# Patient Record
Sex: FEMALE | Race: WHITE | NOT HISPANIC OR LATINO | Employment: FULL TIME | URBAN - NONMETROPOLITAN AREA
[De-identification: names, ages, dates, MRNs, and addresses within clinical notes are randomized per-mention and may not be internally consistent; named-entity substitution may affect disease eponyms.]

---

## 2021-02-18 ENCOUNTER — HOSPITAL ENCOUNTER (EMERGENCY)
Facility: HOSPITAL | Age: 36
Discharge: HOME | End: 2021-02-18
Attending: EMERGENCY MEDICINE

## 2021-02-18 VITALS
OXYGEN SATURATION: 98 % | HEART RATE: 94 BPM | RESPIRATION RATE: 18 BRPM | TEMPERATURE: 98 F | SYSTOLIC BLOOD PRESSURE: 110 MMHG | DIASTOLIC BLOOD PRESSURE: 81 MMHG | BODY MASS INDEX: 25.61 KG/M2 | HEIGHT: 64 IN | WEIGHT: 150 LBS

## 2021-02-18 DIAGNOSIS — U07.1 COVID-19 VIRUS DETECTED: ICD-10-CM

## 2021-02-18 DIAGNOSIS — U07.1 COVID-19 VIRUS INFECTION: Primary | ICD-10-CM

## 2021-02-18 LAB
CTP QC/QA: YES
SARS-COV-2 RDRP RESP QL NAA+PROBE: POSITIVE

## 2021-02-18 PROCEDURE — 99283 EMERGENCY DEPT VISIT LOW MDM: CPT

## 2021-02-18 PROCEDURE — U0002 COVID-19 LAB TEST NON-CDC: HCPCS | Performed by: EMERGENCY MEDICINE

## 2021-02-18 RX ORDER — AZITHROMYCIN 250 MG/1
250 TABLET, FILM COATED ORAL DAILY
Qty: 6 TABLET | Refills: 0 | Status: SHIPPED | OUTPATIENT
Start: 2021-02-18

## 2021-11-30 ENCOUNTER — HOSPITAL ENCOUNTER (EMERGENCY)
Facility: HOSPITAL | Age: 36
Discharge: HOME | End: 2021-11-30
Attending: EMERGENCY MEDICINE

## 2021-11-30 VITALS
DIASTOLIC BLOOD PRESSURE: 72 MMHG | SYSTOLIC BLOOD PRESSURE: 128 MMHG | RESPIRATION RATE: 16 BRPM | HEART RATE: 65 BPM | OXYGEN SATURATION: 100 % | WEIGHT: 142 LBS | BODY MASS INDEX: 24.24 KG/M2 | HEIGHT: 64 IN | TEMPERATURE: 98 F

## 2021-11-30 DIAGNOSIS — R53.1 WEAKNESS: ICD-10-CM

## 2021-11-30 DIAGNOSIS — B34.9 ACUTE VIRAL SYNDROME: Primary | ICD-10-CM

## 2021-11-30 LAB
ALBUMIN SERPL BCP-MCNC: 3.9 G/DL (ref 3.5–5.2)
ALP SERPL-CCNC: 101 U/L (ref 55–135)
ALT SERPL W/O P-5'-P-CCNC: 17 U/L (ref 10–44)
ANION GAP SERPL CALC-SCNC: 7 MMOL/L (ref 8–16)
AST SERPL-CCNC: 17 U/L (ref 10–40)
BASOPHILS # BLD AUTO: 0.04 K/UL (ref 0–0.2)
BASOPHILS NFR BLD: 0.2 % (ref 0–1.9)
BILIRUB SERPL-MCNC: 0.3 MG/DL (ref 0.1–1)
BUN SERPL-MCNC: 15 MG/DL (ref 6–20)
CALCIUM SERPL-MCNC: 9.4 MG/DL (ref 8.7–10.5)
CHLORIDE SERPL-SCNC: 105 MMOL/L (ref 95–110)
CO2 SERPL-SCNC: 26 MMOL/L (ref 23–29)
CREAT SERPL-MCNC: 0.8 MG/DL (ref 0.5–1.4)
CTP QC/QA: YES
DIFFERENTIAL METHOD: ABNORMAL
EOSINOPHIL # BLD AUTO: 0 K/UL (ref 0–0.5)
EOSINOPHIL NFR BLD: 0 % (ref 0–8)
ERYTHROCYTE [DISTWIDTH] IN BLOOD BY AUTOMATED COUNT: 22.1 % (ref 11.5–14.5)
EST. GFR  (AFRICAN AMERICAN): >60 ML/MIN/1.73 M^2
EST. GFR  (NON AFRICAN AMERICAN): >60 ML/MIN/1.73 M^2
GLUCOSE SERPL-MCNC: 127 MG/DL (ref 70–110)
HCT VFR BLD AUTO: 33 % (ref 37–48.5)
HGB BLD-MCNC: 10 G/DL (ref 12–16)
IMM GRANULOCYTES # BLD AUTO: 0.07 K/UL (ref 0–0.04)
IMM GRANULOCYTES NFR BLD AUTO: 0.3 % (ref 0–0.5)
INFLUENZA A, MOLECULAR: NEGATIVE
INFLUENZA B, MOLECULAR: NEGATIVE
LACTATE SERPL-SCNC: 1.7 MMOL/L (ref 0.5–2.2)
LIPASE SERPL-CCNC: 56 U/L (ref 23–300)
LYMPHOCYTES # BLD AUTO: 0.6 K/UL (ref 1–4.8)
LYMPHOCYTES NFR BLD: 3 % (ref 18–48)
MAGNESIUM SERPL-MCNC: 2.1 MG/DL (ref 1.6–2.6)
MCH RBC QN AUTO: 21.8 PG (ref 27–31)
MCHC RBC AUTO-ENTMCNC: 30.3 G/DL (ref 32–36)
MCV RBC AUTO: 72 FL (ref 82–98)
MONOCYTES # BLD AUTO: 0.5 K/UL (ref 0.3–1)
MONOCYTES NFR BLD: 2.6 % (ref 4–15)
NEUTROPHILS # BLD AUTO: 19.5 K/UL (ref 1.8–7.7)
NEUTROPHILS NFR BLD: 93.9 % (ref 38–73)
NRBC BLD-RTO: 0 /100 WBC
PLATELET # BLD AUTO: 456 K/UL (ref 150–450)
PMV BLD AUTO: 10.7 FL (ref 9.2–12.9)
POTASSIUM SERPL-SCNC: 4.6 MMOL/L (ref 3.5–5.1)
PROT SERPL-MCNC: 7.9 G/DL (ref 6–8.4)
RBC # BLD AUTO: 4.59 M/UL (ref 4–5.4)
SARS-COV-2 RDRP RESP QL NAA+PROBE: NEGATIVE
SODIUM SERPL-SCNC: 138 MMOL/L (ref 136–145)
SPECIMEN SOURCE: NORMAL
TROPONIN I SERPL DL<=0.01 NG/ML-MCNC: <0.02 NG/ML (ref 0–0.03)
WBC # BLD AUTO: 20.81 K/UL (ref 3.9–12.7)

## 2021-11-30 PROCEDURE — 63600175 PHARM REV CODE 636 W HCPCS: Performed by: EMERGENCY MEDICINE

## 2021-11-30 PROCEDURE — 25000003 PHARM REV CODE 250: Performed by: EMERGENCY MEDICINE

## 2021-11-30 PROCEDURE — 36415 COLL VENOUS BLD VENIPUNCTURE: CPT | Performed by: EMERGENCY MEDICINE

## 2021-11-30 PROCEDURE — 87502 INFLUENZA DNA AMP PROBE: CPT | Performed by: EMERGENCY MEDICINE

## 2021-11-30 PROCEDURE — 93005 ELECTROCARDIOGRAM TRACING: CPT

## 2021-11-30 PROCEDURE — 93010 ELECTROCARDIOGRAM REPORT: CPT | Mod: ,,, | Performed by: INTERNAL MEDICINE

## 2021-11-30 PROCEDURE — 83735 ASSAY OF MAGNESIUM: CPT | Performed by: EMERGENCY MEDICINE

## 2021-11-30 PROCEDURE — 96361 HYDRATE IV INFUSION ADD-ON: CPT

## 2021-11-30 PROCEDURE — 83605 ASSAY OF LACTIC ACID: CPT | Performed by: EMERGENCY MEDICINE

## 2021-11-30 PROCEDURE — 96375 TX/PRO/DX INJ NEW DRUG ADDON: CPT

## 2021-11-30 PROCEDURE — 83690 ASSAY OF LIPASE: CPT | Performed by: EMERGENCY MEDICINE

## 2021-11-30 PROCEDURE — 96374 THER/PROPH/DIAG INJ IV PUSH: CPT

## 2021-11-30 PROCEDURE — 80053 COMPREHEN METABOLIC PANEL: CPT | Performed by: EMERGENCY MEDICINE

## 2021-11-30 PROCEDURE — U0002 COVID-19 LAB TEST NON-CDC: HCPCS | Performed by: EMERGENCY MEDICINE

## 2021-11-30 PROCEDURE — 93010 EKG 12-LEAD: ICD-10-PCS | Mod: ,,, | Performed by: INTERNAL MEDICINE

## 2021-11-30 PROCEDURE — 85025 COMPLETE CBC W/AUTO DIFF WBC: CPT | Performed by: EMERGENCY MEDICINE

## 2021-11-30 PROCEDURE — 84484 ASSAY OF TROPONIN QUANT: CPT | Performed by: EMERGENCY MEDICINE

## 2021-11-30 PROCEDURE — 99285 EMERGENCY DEPT VISIT HI MDM: CPT | Mod: 25

## 2021-11-30 RX ORDER — PREDNISONE 20 MG/1
20 TABLET ORAL DAILY
Qty: 5 TABLET | Refills: 0 | Status: SHIPPED | OUTPATIENT
Start: 2021-11-30 | End: 2021-12-05

## 2021-11-30 RX ORDER — DEXAMETHASONE SODIUM PHOSPHATE 4 MG/ML
4 INJECTION, SOLUTION INTRA-ARTICULAR; INTRALESIONAL; INTRAMUSCULAR; INTRAVENOUS; SOFT TISSUE
Status: COMPLETED | OUTPATIENT
Start: 2021-11-30 | End: 2021-11-30

## 2021-11-30 RX ORDER — ONDANSETRON 4 MG/1
4 TABLET, ORALLY DISINTEGRATING ORAL EVERY 6 HOURS PRN
Qty: 20 TABLET | Refills: 0 | Status: SHIPPED | OUTPATIENT
Start: 2021-11-30

## 2021-11-30 RX ORDER — DEXAMETHASONE SODIUM PHOSPHATE 4 MG/ML
INJECTION, SOLUTION INTRA-ARTICULAR; INTRALESIONAL; INTRAMUSCULAR; INTRAVENOUS; SOFT TISSUE
Status: DISPENSED
Start: 2021-11-30 | End: 2021-12-01

## 2021-11-30 RX ORDER — KETOROLAC TROMETHAMINE 30 MG/ML
INJECTION, SOLUTION INTRAMUSCULAR; INTRAVENOUS
Status: DISPENSED
Start: 2021-11-30 | End: 2021-12-01

## 2021-11-30 RX ORDER — ONDANSETRON 2 MG/ML
4 INJECTION INTRAMUSCULAR; INTRAVENOUS
Status: COMPLETED | OUTPATIENT
Start: 2021-11-30 | End: 2021-11-30

## 2021-11-30 RX ORDER — ONDANSETRON 2 MG/ML
INJECTION INTRAMUSCULAR; INTRAVENOUS
Status: DISPENSED
Start: 2021-11-30 | End: 2021-12-01

## 2021-11-30 RX ORDER — KETOROLAC TROMETHAMINE 30 MG/ML
15 INJECTION, SOLUTION INTRAMUSCULAR; INTRAVENOUS
Status: COMPLETED | OUTPATIENT
Start: 2021-11-30 | End: 2021-11-30

## 2021-11-30 RX ADMIN — KETOROLAC TROMETHAMINE 15 MG: 30 INJECTION, SOLUTION INTRAMUSCULAR; INTRAVENOUS at 08:11

## 2021-11-30 RX ADMIN — SODIUM CHLORIDE 1000 ML: 0.9 INJECTION, SOLUTION INTRAVENOUS at 07:11

## 2021-11-30 RX ADMIN — DEXAMETHASONE SODIUM PHOSPHATE 4 MG: 4 INJECTION, SOLUTION INTRA-ARTICULAR; INTRALESIONAL; INTRAMUSCULAR; INTRAVENOUS; SOFT TISSUE at 08:11

## 2021-11-30 RX ADMIN — ONDANSETRON HYDROCHLORIDE 4 MG: 2 SOLUTION INTRAMUSCULAR; INTRAVENOUS at 07:11

## 2022-11-15 ENCOUNTER — HOSPITAL ENCOUNTER (EMERGENCY)
Facility: HOSPITAL | Age: 37
Discharge: HOME | End: 2022-11-15
Attending: EMERGENCY MEDICINE

## 2022-11-15 VITALS
BODY MASS INDEX: 24.37 KG/M2 | WEIGHT: 142 LBS | RESPIRATION RATE: 18 BRPM | SYSTOLIC BLOOD PRESSURE: 126 MMHG | DIASTOLIC BLOOD PRESSURE: 75 MMHG | OXYGEN SATURATION: 100 % | TEMPERATURE: 99 F | HEART RATE: 91 BPM

## 2022-11-15 DIAGNOSIS — S40.021A CONTUSION OF RIGHT UPPER ARM, INITIAL ENCOUNTER: ICD-10-CM

## 2022-11-15 DIAGNOSIS — S09.90XA MINOR HEAD INJURY, INITIAL ENCOUNTER: Primary | ICD-10-CM
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
## 2022-11-15 PROCEDURE — 99284 EMERGENCY DEPT VISIT MOD MDM: CPT

## 2022-11-15 RX ORDER — DICLOFENAC SODIUM 75 MG/1
75 TABLET, DELAYED RELEASE ORAL 2 TIMES DAILY
Qty: 10 TABLET | Refills: 0 | Status: SHIPPED | OUTPATIENT
Start: 2022-11-15 | End: 2022-11-20

## 2022-11-15 RX ORDER — METHOCARBAMOL 500 MG/1
1000 TABLET, FILM COATED ORAL 2 TIMES DAILY PRN
Qty: 20 TABLET | Refills: 0 | Status: SHIPPED | OUTPATIENT
Start: 2022-11-15 | End: 2022-11-20

## 2022-11-15 NOTE — Clinical Note
"Ryan Liveleonides Rand was seen and treated in our emergency department on 11/15/2022.  She may return to work on 11/16/2022.       If you have any questions or concerns, please don't hesitate to call.      Ashia Duron NP"

## 2022-11-15 NOTE — DISCHARGE INSTRUCTIONS
Take medications as directed.  It is important that you see your primary doctor in 1-2 days for further evaluation of your symptoms.  Return to the emergency room for worsening condition.

## 2022-11-15 NOTE — Clinical Note
"Ryan Liveleonides Rand was seen and treated in our emergency department on 11/15/2022.  She may return to work on 11/17/2022.       If you have any questions or concerns, please don't hesitate to call.      Ashia Duron NP"
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
## 2023-09-11 ENCOUNTER — HOSPITAL ENCOUNTER (EMERGENCY)
Facility: HOSPITAL | Age: 38
Discharge: HOME | End: 2023-09-11
Attending: EMERGENCY MEDICINE

## 2023-09-11 VITALS
OXYGEN SATURATION: 99 % | BODY MASS INDEX: 25.03 KG/M2 | TEMPERATURE: 98 F | HEART RATE: 81 BPM | DIASTOLIC BLOOD PRESSURE: 74 MMHG | WEIGHT: 146.63 LBS | HEIGHT: 64 IN | RESPIRATION RATE: 16 BRPM | SYSTOLIC BLOOD PRESSURE: 125 MMHG

## 2023-09-11 DIAGNOSIS — R09.89 RUNNY NOSE: ICD-10-CM

## 2023-09-11 DIAGNOSIS — R51.9 NONINTRACTABLE HEADACHE, UNSPECIFIED CHRONICITY PATTERN, UNSPECIFIED HEADACHE TYPE: Primary | ICD-10-CM

## 2023-09-11 LAB
CTP QC/QA: YES
SARS-COV-2 RDRP RESP QL NAA+PROBE: NEGATIVE

## 2023-09-11 PROCEDURE — 87635 SARS-COV-2 COVID-19 AMP PRB: CPT | Performed by: NURSE PRACTITIONER

## 2023-09-11 PROCEDURE — 99283 EMERGENCY DEPT VISIT LOW MDM: CPT

## 2023-09-11 RX ORDER — IBUPROFEN 600 MG/1
600 TABLET ORAL EVERY 8 HOURS PRN
Qty: 9 TABLET | Refills: 0 | Status: SHIPPED | OUTPATIENT
Start: 2023-09-11 | End: 2023-09-14

## 2023-09-11 RX ORDER — CETIRIZINE HYDROCHLORIDE 10 MG/1
10 TABLET ORAL DAILY
Qty: 10 TABLET | Refills: 0 | Status: SHIPPED | OUTPATIENT
Start: 2023-09-11 | End: 2023-09-21

## 2023-09-11 NOTE — ED PROVIDER NOTES
"Encounter Date: 9/11/2023       History     Chief Complaint   Patient presents with    Headache     Patient stated "I was feeling bad yesterday and when I went to work some people had the Covid". Patient complaining of a headache today.      This is a 37-year-old black female with noncontributory past medical history who presents to the emergency department with concerns regarding COVID-19 after known exposure at work.  Patient reports generalized headache and runny nose.  She denies known fever, cough, vomiting, or diarrhea.      Review of patient's allergies indicates:  No Known Allergies  No past medical history on file.  No past surgical history on file.  No family history on file.  Social History     Tobacco Use    Smoking status: Never     Review of Systems   Constitutional:  Negative for fever.   HENT:  Positive for rhinorrhea. Negative for congestion and sore throat.    Gastrointestinal:  Negative for diarrhea and vomiting.   Neurological:  Positive for headaches.       Physical Exam     Initial Vitals [09/11/23 1730]   BP Pulse Resp Temp SpO2   125/74 81 16 98 °F (36.7 °C) 99 %      MAP       --         Physical Exam    Nursing note and vitals reviewed.  Constitutional: She appears well-developed and well-nourished. She is active. No distress.   HENT:   Head: Normocephalic and atraumatic.   Mouth/Throat: No oropharyngeal exudate.   Eyes: EOM are normal. Pupils are equal, round, and reactive to light.   Neck: Neck supple.   Normal range of motion.  Cardiovascular:  Normal rate, regular rhythm and normal heart sounds.           Pulmonary/Chest: Breath sounds normal. No respiratory distress.   Musculoskeletal:         General: Normal range of motion.      Cervical back: Normal range of motion and neck supple.     Neurological: She is alert and oriented to person, place, and time. GCS score is 15. GCS eye subscore is 4. GCS verbal subscore is 5. GCS motor subscore is 6.   Skin: Skin is warm and dry. Capillary " refill takes less than 2 seconds.   Psychiatric: She has a normal mood and affect. Her behavior is normal. Thought content normal.         ED Course   Procedures  Labs Reviewed   SARS-COV-2 RDRP GENE    Narrative:     This test utilizes isothermal nucleic acid amplification technology to detect the SARS-CoV-2 RdRp nucleic acid segment. The analytical sensitivity (limit of detection) is 500 copies/swab.     A POSITIVE result is indicative of the presence of SARS-CoV-2 RNA; clinical correlation with patient history and other diagnostic information is necessary to determine patient infection status.    A NEGATIVE result means that SARS-CoV-2 nucleic acids are not present above the limit of detection. A NEGATIVE result should be treated as presumptive. It does not rule out the possibility of COVID-19 and should not be the sole basis for treatment decisions. If COVID-19 is strongly suspected based on clinical and exposure history, re-testing using an alternate molecular assay should be considered.     This test is only for use under the Food and Drug Administration s Emergency Use Authorization (EUA).     Commercial kits are provided by [facility]. Performance characteristics of the EUA have been independently verified by [facility] Department of Pathology and Laboratory Medicine.   _________________________________________________________________   The authorized Fact Sheet for Healthcare Providers and the authorized Fact Sheet for Patients of the ID NOW COVID-19 are available on the FDA website:    https://www.fda.gov/media/180363/download      https://www.fda.gov/media/005268/download             Imaging Results    None          Medications - No data to display  Medical Decision Making  Amount and/or Complexity of Data Reviewed  Labs: ordered.    Risk  OTC drugs.  Prescription drug management.               ED Course as of 09/11/23 1747   Mon Sep 11, 2023   1745 Rapid COVID-19 test negative [CB]       ED Course User Index  [CB] Ashia Duron NP                    Clinical Impression:   Final diagnoses:  [R51.9] Nonintractable headache, unspecified chronicity pattern, unspecified headache type (Primary)  [R09.89] Runny nose        ED Disposition Condition    Discharge Stable          ED Prescriptions       Medication Sig Dispense Start Date End Date Auth. Provider    ibuprofen (ADVIL,MOTRIN) 600 MG tablet Take 1 tablet (600 mg total) by mouth every 8 (eight) hours as needed for Pain. 9 tablet 9/11/2023 9/14/2023 Ashia Duron NP    cetirizine (ZYRTEC) 10 MG tablet Take 1 tablet (10 mg total) by mouth once daily. for 10 days 10 tablet 9/11/2023 9/21/2023 Ashia Duron NP          Follow-up Information       Follow up With Specialties Details Why Contact Info    PCP Follow UP  Schedule an appointment as soon as possible for a visit in 2 days for follow-up, for re-evaluation of today's complaint              Ashia Duron NP  09/11/23 3856

## 2023-09-11 NOTE — DISCHARGE INSTRUCTIONS
Take medications as directed, or you may take over-the-counter medications as directed for your symptoms.  Return to the emergency room for worsening condition.

## 2023-09-11 NOTE — Clinical Note
"Ryan "Henok Rand was seen and treated in our emergency department on 9/11/2023.     COVID-19 is present in our communities across the state. There is limited testing for COVID at this time, so not all patients can be tested. In this situation, your employee meets the following criteria:    Ryan Rand has met the criteria for COVID-19 testing and has a NEGATIVE result. The employee can return to work once they are asymptomatic for 24 hours without the use of fever reducing medications (Tylenol, Motrin, etc).     If the employee is not fully vaccinated and had a close contact:  · Retest at 5 to 7 days post-exposure  · If possible, it is recommended that they quarantine for 5 days from the time of contact regardless of their test status.  · A mask should be worn post quarantine for 5 days.    If you have any questions or concerns, or if I can be of further assistance, please do not hesitate to contact me.    Sincerely,             Ashia Duron NP"

## 2023-09-11 NOTE — Clinical Note
"Ryan Liveleonides Rand was seen and treated in our emergency department on 9/11/2023.  She may return to work on 09/12/2023.       If you have any questions or concerns, please don't hesitate to call.      Ashia Duron, TIMO"

## 2024-08-14 ENCOUNTER — HOSPITAL ENCOUNTER (EMERGENCY)
Facility: HOSPITAL | Age: 39
Discharge: HOME | End: 2024-08-14
Attending: EMERGENCY MEDICINE

## 2024-08-14 VITALS
WEIGHT: 146 LBS | OXYGEN SATURATION: 97 % | HEART RATE: 76 BPM | SYSTOLIC BLOOD PRESSURE: 126 MMHG | DIASTOLIC BLOOD PRESSURE: 81 MMHG | RESPIRATION RATE: 18 BRPM | TEMPERATURE: 98 F | BODY MASS INDEX: 24.92 KG/M2 | HEIGHT: 64 IN

## 2024-08-14 DIAGNOSIS — Z20.822 CLOSE EXPOSURE TO COVID-19 VIRUS: Primary | ICD-10-CM

## 2024-08-14 PROCEDURE — 99281 EMR DPT VST MAYX REQ PHY/QHP: CPT

## 2024-08-14 NOTE — Clinical Note
"Ryan"Henok Rand was seen and treated in our emergency department on 8/14/2024.     COVID-19 is present in our communities across the state. There is limited testing for COVID at this time, so not all patients can be tested. In this situation, your employee meets the following criteria:    Ryan Rand has expressed a desire/need to be tested for the COVID-19 virus but has none of the symptoms that one would associate with COVID-19. In the absence of symptoms, the patient does NOT meet the criteria for testing and should proceed with their work schedule as planned, following the routine infection control policies of the employer, as well as good hand hygiene.      If you have any questions or concerns, or if I can be of further assistance, please do not hesitate to contact me.    Sincerely,             Ashia Duron NP"
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
## 2024-08-14 NOTE — Clinical Note
"Ryan"Henok Rand was seen and treated in our emergency department on 8/14/2024.     COVID-19 is present in our communities across the state. There is limited testing for COVID at this time, so not all patients can be tested. In this situation, your employee meets the following criteria:    Ryan Rand has not been tested for COVID-19. She can return to work when the following conditions are met:  24 hours fever free without fever-reducing medications AND  Improved symptoms  If they are fully vaccinated or have not had close contact with someone with COVID-19 (within 6 feet for 15 minutes or more)    If they are fully vaccinated and had a close contact, there is no need for quarantine, unless they develop symptoms.      If they are not fully vaccinated and had a close contact:  Retest at 5 to 7 days post-exposure  If possible, it is recommended that them quarantine for 5 days from the time of contact regardless of their test status.  A mask should be worn indoors post quarantine for 5 days.    Infection control policies of the employer should be followed, as well as good hand hygiene.        If you have any questions or concerns, or if I can be of further assistance, please do not hesitate to contact me.    Sincerely,             Ashia Duron NP "

## 2025-05-19 ENCOUNTER — HOSPITAL ENCOUNTER (EMERGENCY)
Facility: HOSPITAL | Age: 40
Discharge: HOME | End: 2025-05-19
Attending: EMERGENCY MEDICINE

## 2025-05-19 VITALS
TEMPERATURE: 98 F | DIASTOLIC BLOOD PRESSURE: 68 MMHG | SYSTOLIC BLOOD PRESSURE: 109 MMHG | RESPIRATION RATE: 17 BRPM | WEIGHT: 142 LBS | HEIGHT: 64 IN | HEART RATE: 103 BPM | BODY MASS INDEX: 24.24 KG/M2 | OXYGEN SATURATION: 97 %

## 2025-05-19 DIAGNOSIS — R19.7 VOMITING AND DIARRHEA: Primary | ICD-10-CM

## 2025-05-19 DIAGNOSIS — R11.10 VOMITING AND DIARRHEA: Primary | ICD-10-CM

## 2025-05-19 PROCEDURE — 63600175 PHARM REV CODE 636 W HCPCS: Performed by: EMERGENCY MEDICINE

## 2025-05-19 PROCEDURE — 96372 THER/PROPH/DIAG INJ SC/IM: CPT | Performed by: EMERGENCY MEDICINE

## 2025-05-19 PROCEDURE — 99284 EMERGENCY DEPT VISIT MOD MDM: CPT | Mod: 25

## 2025-05-19 PROCEDURE — 25000003 PHARM REV CODE 250: Performed by: EMERGENCY MEDICINE

## 2025-05-19 RX ORDER — ONDANSETRON 4 MG/1
4 TABLET, ORALLY DISINTEGRATING ORAL EVERY 6 HOURS PRN
Qty: 20 TABLET | Refills: 0 | Status: SHIPPED | OUTPATIENT
Start: 2025-05-19

## 2025-05-19 RX ORDER — ONDANSETRON 4 MG/1
4 TABLET, ORALLY DISINTEGRATING ORAL
Status: COMPLETED | OUTPATIENT
Start: 2025-05-19 | End: 2025-05-19

## 2025-05-19 RX ORDER — DICYCLOMINE HYDROCHLORIDE 10 MG/ML
20 INJECTION INTRAMUSCULAR
Status: COMPLETED | OUTPATIENT
Start: 2025-05-19 | End: 2025-05-19

## 2025-05-19 RX ADMIN — DICYCLOMINE HYDROCHLORIDE 20 MG: 10 INJECTION, SOLUTION INTRAMUSCULAR at 11:05

## 2025-05-19 RX ADMIN — ONDANSETRON 4 MG: 4 TABLET, ORALLY DISINTEGRATING ORAL at 11:05

## 2025-05-20 NOTE — ED PROVIDER NOTES
Encounter Date: 5/19/2025       History     Chief Complaint   Patient presents with    Emesis     Vomiting and diarrhea x 6 hrs.      38 yo female here with N/V/D x 6 hours. No fever. No focal abd pain.       Review of patient's allergies indicates:  No Known Allergies  History reviewed. No pertinent past medical history.  History reviewed. No pertinent surgical history.  No family history on file.  Social History[1]  Review of Systems   Constitutional: Negative.    Respiratory: Negative.     Cardiovascular: Negative.    Gastrointestinal:  Positive for diarrhea, nausea and vomiting.   All other systems reviewed and are negative.      Physical Exam     Initial Vitals [05/19/25 2257]   BP Pulse Resp Temp SpO2   109/68 103 17 98.3 °F (36.8 °C) 97 %      MAP       --         Physical Exam    Nursing note and vitals reviewed.  Constitutional: She appears well-developed and well-nourished. She is not diaphoretic. No distress.   HENT:   Head: Normocephalic and atraumatic.   Eyes: EOM are normal. Pupils are equal, round, and reactive to light.   Neck: Neck supple.   Normal range of motion.  Cardiovascular:  Normal rate, regular rhythm and intact distal pulses.           Pulmonary/Chest: Breath sounds normal. No respiratory distress. She has no wheezes. She has no rales.   Abdominal: Abdomen is soft. Bowel sounds are normal. She exhibits no distension. There is no abdominal tenderness. There is no rebound and no guarding.   Musculoskeletal:         General: No tenderness or edema. Normal range of motion.      Cervical back: Normal range of motion and neck supple.     Neurological: She is alert. She has normal strength and normal reflexes.   Skin: Skin is warm and dry. Capillary refill takes less than 2 seconds.         ED Course   Procedures  Labs Reviewed - No data to display       Imaging Results    None          Medications   ondansetron disintegrating tablet 4 mg (has no administration in time range)   dicyclomine  injection 20 mg (has no administration in time range)     Medical Decision Making  Stable vitals. Benign exam. Will treat symptomatically.     Problems Addressed:  Vomiting and diarrhea: acute illness or injury    Risk  Prescription drug management.                                      Clinical Impression:  Final diagnoses:  [R11.10, R19.7] Vomiting and diarrhea (Primary)          ED Disposition Condition    Discharge Stable          ED Prescriptions       Medication Sig Dispense Start Date End Date Auth. Provider    ondansetron (ZOFRAN-ODT) 4 MG TbDL Take 1 tablet (4 mg total) by mouth every 6 (six) hours as needed. 20 tablet 5/19/2025 -- Flavio Pickering MD          Follow-up Information    None                [1]   Social History  Tobacco Use    Smoking status: Never        Flavio Pickering MD  05/19/25 1353